# Patient Record
Sex: MALE | Race: BLACK OR AFRICAN AMERICAN | Employment: UNEMPLOYED | URBAN - METROPOLITAN AREA
[De-identification: names, ages, dates, MRNs, and addresses within clinical notes are randomized per-mention and may not be internally consistent; named-entity substitution may affect disease eponyms.]

---

## 2019-12-11 ENCOUNTER — OFFICE VISIT (OUTPATIENT)
Dept: ENT CLINIC | Age: 39
End: 2019-12-11
Payer: MEDICAID

## 2019-12-11 VITALS — HEART RATE: 65 BPM | WEIGHT: 205 LBS | DIASTOLIC BLOOD PRESSURE: 80 MMHG | SYSTOLIC BLOOD PRESSURE: 123 MMHG

## 2019-12-11 DIAGNOSIS — H91.91 HEARING LOSS OF RIGHT EAR, UNSPECIFIED HEARING LOSS TYPE: Primary | ICD-10-CM

## 2019-12-11 PROCEDURE — 99203 OFFICE O/P NEW LOW 30 MIN: CPT | Performed by: OTOLARYNGOLOGY

## 2019-12-11 RX ORDER — GABAPENTIN 100 MG/1
100 CAPSULE ORAL 3 TIMES DAILY
COMMUNITY
End: 2021-11-09

## 2019-12-11 RX ORDER — THIAMINE MONONITRATE (VIT B1) 100 MG
100 TABLET ORAL DAILY
COMMUNITY
End: 2021-11-09

## 2020-01-07 ENCOUNTER — PROCEDURE VISIT (OUTPATIENT)
Dept: AUDIOLOGY | Age: 40
End: 2020-01-07
Payer: MEDICAID

## 2020-01-07 PROCEDURE — 92557 COMPREHENSIVE HEARING TEST: CPT | Performed by: AUDIOLOGIST

## 2020-01-07 PROCEDURE — 92550 TYMPANOMETRY & REFLEX THRESH: CPT | Performed by: AUDIOLOGIST

## 2020-01-07 NOTE — PROGRESS NOTES
Subjective:      Patient ID: Balinda Bloch is a 37 y.o. male. Brandy Hameed reports a history of hearing loss since he was an infant. He wore a hearing aid in grade school but does not currently. He currently complains of communication problems. Objective:       Assessment:      Moderate sensorineural hearing loss for the left ear with a severe sensorineural hearing loss for the right ear.  Normal tympanograms were noted      Plan:      Medical follow-up   Hearing retest as needed  Hearing aid evaluation if not medically contraindicated    See scan audiogram and tympanogram for additional details          Valentin Ritchie, AuD

## 2020-04-04 ENCOUNTER — TELEPHONE (OUTPATIENT)
Dept: ENT CLINIC | Age: 40
End: 2020-04-04

## 2021-11-09 ENCOUNTER — OFFICE VISIT (OUTPATIENT)
Dept: INTERNAL MEDICINE CLINIC | Age: 41
End: 2021-11-09
Payer: MEDICAID

## 2021-11-09 VITALS
HEIGHT: 71 IN | HEART RATE: 75 BPM | OXYGEN SATURATION: 96 % | DIASTOLIC BLOOD PRESSURE: 80 MMHG | SYSTOLIC BLOOD PRESSURE: 112 MMHG | WEIGHT: 191.8 LBS | BODY MASS INDEX: 26.85 KG/M2

## 2021-11-09 DIAGNOSIS — Z13.220 SCREENING FOR CHOLESTEROL LEVEL: ICD-10-CM

## 2021-11-09 DIAGNOSIS — Z00.00 PREVENTATIVE HEALTH CARE: ICD-10-CM

## 2021-11-09 DIAGNOSIS — Z00.00 PREVENTATIVE HEALTH CARE: Primary | ICD-10-CM

## 2021-11-09 DIAGNOSIS — F10.10 ALCOHOL ABUSE: ICD-10-CM

## 2021-11-09 DIAGNOSIS — F19.91 HISTORY OF DRUG USE: ICD-10-CM

## 2021-11-09 DIAGNOSIS — Z11.3 SCREEN FOR STD (SEXUALLY TRANSMITTED DISEASE): ICD-10-CM

## 2021-11-09 LAB
A/G RATIO: 2 (ref 1.1–2.2)
ALBUMIN SERPL-MCNC: 4.9 G/DL (ref 3.4–5)
ALP BLD-CCNC: 86 U/L (ref 40–129)
ALT SERPL-CCNC: 98 U/L (ref 10–40)
ANION GAP SERPL CALCULATED.3IONS-SCNC: 13 MMOL/L (ref 3–16)
AST SERPL-CCNC: 293 U/L (ref 15–37)
BASOPHILS ABSOLUTE: 0 K/UL (ref 0–0.2)
BASOPHILS RELATIVE PERCENT: 1 %
BILIRUB SERPL-MCNC: 0.3 MG/DL (ref 0–1)
BILIRUBIN URINE: NEGATIVE
BLOOD, URINE: NEGATIVE
BUN BLDV-MCNC: 16 MG/DL (ref 7–20)
CALCIUM SERPL-MCNC: 9.9 MG/DL (ref 8.3–10.6)
CHLORIDE BLD-SCNC: 102 MMOL/L (ref 99–110)
CHOLESTEROL, TOTAL: 210 MG/DL (ref 0–199)
CLARITY: CLEAR
CO2: 25 MMOL/L (ref 21–32)
COLOR: YELLOW
CREAT SERPL-MCNC: 1.1 MG/DL (ref 0.9–1.3)
EOSINOPHILS ABSOLUTE: 0.1 K/UL (ref 0–0.6)
EOSINOPHILS RELATIVE PERCENT: 3 %
EPITHELIAL CELLS, UA: 0 /HPF (ref 0–5)
GFR AFRICAN AMERICAN: >60
GFR NON-AFRICAN AMERICAN: >60
GLUCOSE BLD-MCNC: 75 MG/DL (ref 70–99)
GLUCOSE URINE: NEGATIVE MG/DL
HCT VFR BLD CALC: 42.8 % (ref 40.5–52.5)
HDLC SERPL-MCNC: 55 MG/DL (ref 40–60)
HEMOGLOBIN: 14 G/DL (ref 13.5–17.5)
HEPATITIS C ANTIBODY INTERPRETATION: NORMAL
HYALINE CASTS: 0 /LPF (ref 0–8)
KETONES, URINE: NEGATIVE MG/DL
LDL CHOLESTEROL CALCULATED: 126 MG/DL
LEUKOCYTE ESTERASE, URINE: NEGATIVE
LYMPHOCYTES ABSOLUTE: 1.7 K/UL (ref 1–5.1)
LYMPHOCYTES RELATIVE PERCENT: 40 %
MCH RBC QN AUTO: 29.6 PG (ref 26–34)
MCHC RBC AUTO-ENTMCNC: 32.8 G/DL (ref 31–36)
MCV RBC AUTO: 90.4 FL (ref 80–100)
MICROSCOPIC EXAMINATION: NORMAL
MONOCYTES ABSOLUTE: 0.3 K/UL (ref 0–1.3)
MONOCYTES RELATIVE PERCENT: 7 %
NEUTROPHILS ABSOLUTE: 2.1 K/UL (ref 1.7–7.7)
NEUTROPHILS RELATIVE PERCENT: 49 %
NITRITE, URINE: NEGATIVE
PDW BLD-RTO: 13.5 % (ref 12.4–15.4)
PH UA: 6.5 (ref 5–8)
PLATELET # BLD: 262 K/UL (ref 135–450)
PLATELET SLIDE REVIEW: ADEQUATE
PMV BLD AUTO: 8.5 FL (ref 5–10.5)
POTASSIUM SERPL-SCNC: 5.3 MMOL/L (ref 3.5–5.1)
PROTEIN UA: NEGATIVE MG/DL
RBC # BLD: 4.74 M/UL (ref 4.2–5.9)
RBC # BLD: NORMAL 10*6/UL
RBC UA: 2 /HPF (ref 0–4)
SLIDE REVIEW: NORMAL
SODIUM BLD-SCNC: 140 MMOL/L (ref 136–145)
SPECIFIC GRAVITY UA: 1.02 (ref 1–1.03)
TOTAL PROTEIN: 7.4 G/DL (ref 6.4–8.2)
TRIGL SERPL-MCNC: 144 MG/DL (ref 0–150)
URINE TYPE: NORMAL
UROBILINOGEN, URINE: 1 E.U./DL
VLDLC SERPL CALC-MCNC: 29 MG/DL
WBC # BLD: 4.2 K/UL (ref 4–11)
WBC UA: 0 /HPF (ref 0–5)

## 2021-11-09 PROCEDURE — G8484 FLU IMMUNIZE NO ADMIN: HCPCS | Performed by: INTERNAL MEDICINE

## 2021-11-09 PROCEDURE — 99386 PREV VISIT NEW AGE 40-64: CPT | Performed by: INTERNAL MEDICINE

## 2021-11-09 SDOH — ECONOMIC STABILITY: FOOD INSECURITY: WITHIN THE PAST 12 MONTHS, YOU WORRIED THAT YOUR FOOD WOULD RUN OUT BEFORE YOU GOT MONEY TO BUY MORE.: OFTEN TRUE

## 2021-11-09 SDOH — ECONOMIC STABILITY: FOOD INSECURITY: WITHIN THE PAST 12 MONTHS, THE FOOD YOU BOUGHT JUST DIDN'T LAST AND YOU DIDN'T HAVE MONEY TO GET MORE.: OFTEN TRUE

## 2021-11-09 ASSESSMENT — ENCOUNTER SYMPTOMS
WHEEZING: 0
ABDOMINAL PAIN: 0
CONSTIPATION: 0
COLOR CHANGE: 0
BLOOD IN STOOL: 0
CHEST TIGHTNESS: 0
SHORTNESS OF BREATH: 0
COUGH: 0
SINUS PAIN: 0

## 2021-11-09 ASSESSMENT — PATIENT HEALTH QUESTIONNAIRE - PHQ9
SUM OF ALL RESPONSES TO PHQ QUESTIONS 1-9: 2
SUM OF ALL RESPONSES TO PHQ9 QUESTIONS 1 & 2: 2
SUM OF ALL RESPONSES TO PHQ QUESTIONS 1-9: 2
1. LITTLE INTEREST OR PLEASURE IN DOING THINGS: 1
2. FEELING DOWN, DEPRESSED OR HOPELESS: 1
SUM OF ALL RESPONSES TO PHQ QUESTIONS 1-9: 2

## 2021-11-09 ASSESSMENT — SOCIAL DETERMINANTS OF HEALTH (SDOH): HOW HARD IS IT FOR YOU TO PAY FOR THE VERY BASICS LIKE FOOD, HOUSING, MEDICAL CARE, AND HEATING?: SOMEWHAT HARD

## 2021-11-09 NOTE — PROGRESS NOTES
Antione Machuca (:  1980) is a 35 y.o. male,New patient, here for evaluation of the following chief complaint(s):  Established New Doctor (screened fgot std)         ASSESSMENT/PLAN:  1. Preventative health care  -     CBC Auto Differential; Future  -     Comprehensive Metabolic Panel; Future  2. Screening for cholesterol level  -     Lipid Panel; Future  3. Screen for STD (sexually transmitted disease)  -     C.trachomatis N.gonorrhoeae DNA, Urine; Future  -     Culture, Urine; Future  -     Hepatitis C Antibody; Future  -     HIV Screen; Future  -     Syphilis Antibody Cascading Reflex; Future  -     Urinalysis with Microscopic; Future  4. Alcohol abuse  5. History of drug use  At this point the patient biggest risk is his drug use and alcohol abuse history he is undergoing rehab and hopefully will be successful with that but will screen for other underlying medical conditions and states STDs if everything is normal we will just monitor him clinically and follow-up in 6 months to make sure he is doing well with his rehab    Return in about 6 months (around 2022). Subjective   SUBJECTIVE/OBJECTIVE:    Lab Review   No results found for: NA, K, CO2, BUN, CREATININE, GLUCOSE, CALCIUM  No results found for: WBC, HGB, HCT, MCV, PLT  No results found for: CHOL, TRIG, HDL, LDLDIRECT    Vitals 2021    SYSTOLIC 583 [id number]   DIASTOLIC 80 80   Pulse 75 65   SpO2 96 -   Weight 191 lb 12.8 oz 205 lb   Height 5' 11\" -   Body mass index 26.75 kg/m2 -       Doing fairly well he is interested in having testing for STDs    Patient is undergoing drug and alcohol rehab and is doing fairly well with that and reports that he has not used any drugs recently      Review of Systems   Constitutional: Negative for activity change, appetite change, fatigue and unexpected weight change. HENT: Negative for congestion, ear pain and sinus pain.     Respiratory: Negative for cough, chest tightness, shortness of breath and wheezing. Cardiovascular: Negative for chest pain and palpitations. Gastrointestinal: Negative for abdominal pain, blood in stool and constipation. Endocrine: Negative for cold intolerance, heat intolerance and polyuria. Genitourinary: Negative for dysuria, frequency and urgency. Musculoskeletal: Negative for arthralgias and myalgias. Skin: Negative for color change and rash. Neurological: Negative for weakness and headaches. Hematological: Negative for adenopathy. Does not bruise/bleed easily. Psychiatric/Behavioral: Negative for agitation, dysphoric mood and sleep disturbance. Objective   Physical Exam  Vitals and nursing note reviewed. Constitutional:       General: He is not in acute distress. Appearance: Normal appearance. HENT:      Head: Normocephalic and atraumatic. Right Ear: Tympanic membrane normal.      Left Ear: Tympanic membrane normal.      Nose: Nose normal.   Eyes:      Extraocular Movements: Extraocular movements intact. Conjunctiva/sclera: Conjunctivae normal.      Pupils: Pupils are equal, round, and reactive to light. Neck:      Vascular: No carotid bruit. Cardiovascular:      Rate and Rhythm: Normal rate and regular rhythm. Pulses: Normal pulses. Heart sounds: No murmur heard. Pulmonary:      Effort: Pulmonary effort is normal. No respiratory distress. Breath sounds: Normal breath sounds. Abdominal:      General: Abdomen is flat. Bowel sounds are normal. There is no distension. Palpations: Abdomen is soft. Tenderness: There is no abdominal tenderness. Musculoskeletal:         General: No swelling, tenderness or deformity. Cervical back: Normal range of motion and neck supple. No rigidity or tenderness. Right lower leg: No edema. Left lower leg: No edema. Lymphadenopathy:      Cervical: No cervical adenopathy. Skin:     Coloration: Skin is not jaundiced.       Findings: No bruising, erythema or lesion. Neurological:      General: No focal deficit present. Mental Status: He is alert and oriented to person, place, and time. Cranial Nerves: No cranial nerve deficit. Motor: No weakness. Gait: Gait normal.            This dictation was generated by voice recognition computer software. Although all attempts are made to edit the dictation for accuracy, there may be errors in the transcription that are not intended. An electronic signature was used to authenticate this note.     --Rufino Bernal MD

## 2021-11-10 DIAGNOSIS — K75.9 HEPATITIS: Primary | ICD-10-CM

## 2021-11-10 LAB
C. TRACHOMATIS DNA ,URINE: NEGATIVE
HIV AG/AB: NORMAL
HIV ANTIGEN: NORMAL
HIV-1 ANTIBODY: NORMAL
HIV-2 AB: NORMAL
N. GONORRHOEAE DNA, URINE: NEGATIVE
RPR CONFIRMATORY: NORMAL
TOTAL SYPHILLIS IGG/IGM: REACTIVE
URINE CULTURE, ROUTINE: NORMAL

## 2021-11-14 LAB — TREPONEMA PALLIDUM ANTIBODIES: REACTIVE

## 2021-11-30 ENCOUNTER — HOSPITAL ENCOUNTER (OUTPATIENT)
Dept: ULTRASOUND IMAGING | Age: 41
Discharge: HOME | End: 2021-11-30
Payer: MEDICAID

## 2021-11-30 DIAGNOSIS — K75.9 HEPATITIS: ICD-10-CM

## 2021-11-30 PROCEDURE — 76705 ECHO EXAM OF ABDOMEN: CPT

## 2022-01-05 ENCOUNTER — OFFICE VISIT (OUTPATIENT)
Dept: ENT CLINIC | Age: 42
End: 2022-01-05
Payer: MEDICAID

## 2022-01-05 VITALS — SYSTOLIC BLOOD PRESSURE: 119 MMHG | DIASTOLIC BLOOD PRESSURE: 81 MMHG | TEMPERATURE: 97.7 F

## 2022-01-05 DIAGNOSIS — H90.3 BILATERAL SENSORINEURAL HEARING LOSS: ICD-10-CM

## 2022-01-05 DIAGNOSIS — H61.23 IMPACTED CERUMEN OF BOTH EARS: ICD-10-CM

## 2022-01-05 PROCEDURE — G8419 CALC BMI OUT NRM PARAM NOF/U: HCPCS | Performed by: OTOLARYNGOLOGY

## 2022-01-05 PROCEDURE — G8427 DOCREV CUR MEDS BY ELIG CLIN: HCPCS | Performed by: OTOLARYNGOLOGY

## 2022-01-05 PROCEDURE — 99213 OFFICE O/P EST LOW 20 MIN: CPT | Performed by: OTOLARYNGOLOGY

## 2022-01-05 PROCEDURE — G8484 FLU IMMUNIZE NO ADMIN: HCPCS | Performed by: OTOLARYNGOLOGY

## 2022-01-05 PROCEDURE — 4004F PT TOBACCO SCREEN RCVD TLK: CPT | Performed by: OTOLARYNGOLOGY

## 2022-01-05 ASSESSMENT — ENCOUNTER SYMPTOMS
SINUS PAIN: 0
RHINORRHEA: 0
SORE THROAT: 0

## 2022-01-05 NOTE — PROGRESS NOTES
[facility]         PCP:  Maxine Sampson MD      CHIEF COMPLAINT  Chief Complaint   Patient presents with    Hearing Loss     Chronic, both ears    Cerumen Impaction     Both ears       HISTORY OF PRESENT ILLNESS       Scout Llanes is a 35 y.o. male here for evaluation and treatment of hearing loss in both ears, for several years. He saw his audiologist recently and was found to have excessive impacted cerumen in both ears. He was referred for evaluation of the chronic hearing loss as well as for removal of the cerumen impaction. He denied any other current ear nose throat or sinus problems. Patient stated that his hearing loss is congenital \"I was born with it. \"  He stated that he has used hearing aids since childhood. REVIEW OF SYSTEMS   Review of Systems   Constitutional: Negative for chills and fever. HENT: Positive for hearing loss and tinnitus (ringing off and on). Negative for congestion, ear discharge, ear pain, rhinorrhea, sinus pain and sore throat. PAST MEDICAL HISTORY    Past Medical History:   Diagnosis Date    Alcohol abuse 11/9/2021    History of drug use 11/9/2021       No past surgical history on file. EXAMINATION    Vitals:    01/05/22 1418   BP: 119/81   Site: Right Upper Arm   Position: Sitting   Cuff Size: Large Adult   Temp: 97.7 °F (36.5 °C)   TempSrc: Temporal       Physical Exam  Vitals reviewed. Constitutional:       General: He is awake. He is not in acute distress. Appearance: Normal appearance. He is well-developed. He is not ill-appearing or toxic-appearing. HENT:      Head: Normocephalic and atraumatic. Salivary Glands: Right salivary gland is not diffusely enlarged or tender. Left salivary gland is not diffusely enlarged or tender.       Right Ear: Tympanic membrane, ear canal and external ear normal.      Left Ear: Tympanic membrane, ear canal and external ear normal.      Ears:      Comments: Bilateral otomicroscopy performed. TMs and EACs normal     Nose: Nose normal. No nasal deformity, septal deviation, mucosal edema, congestion or rhinorrhea. Right Turbinates: Not enlarged. Left Turbinates: Not enlarged. Right Sinus: No maxillary sinus tenderness or frontal sinus tenderness. Left Sinus: No maxillary sinus tenderness or frontal sinus tenderness. Mouth/Throat:      Lips: Pink. No lesions. Mouth: Mucous membranes are moist. No oral lesions. Tongue: No lesions. Palate: No mass and lesions. Pharynx: Oropharynx is clear. Uvula midline. No oropharyngeal exudate or posterior oropharyngeal erythema. Tonsils: No tonsillar exudate or tonsillar abscesses. Neck:      Thyroid: No thyroid mass, thyromegaly or thyroid tenderness. Trachea: No tracheal deviation. Comments: No cervical masses or tenderness. Musculoskeletal:      Cervical back: Normal range of motion and neck supple. Lymphadenopathy:      Cervical: No cervical adenopathy. Neurological:      Mental Status: He is alert. PROCEDURE - REMOVAL OF CERUMEN IMPACTION (13986):  Obstructing cerumen impaction partially occluding both of the EACs  and obscuring complete isualization of the both tympanic membranes, was removed under otomicroscopic visualization, with instrumentation, using a Billeau wire loop  Under otomicroscopic examination, the bilateral EACs and TMs appeared to be normal.                                           IMPRESSION / DIAGNOSES / Kimmy Valadez was seen today for hearing loss and cerumen impaction. Diagnoses and all orders for this visit:    Bilateral sensorineural hearing loss  Comments:  Congenital, and at least since childhood, according to patient. Impacted cerumen of both ears         RECOMMENDATIONS/PLAN      1. Okay for bilateral hearing aids.     2. Return for symptoms of excessive ear wax, or any other ear, nose, throat, or sinus problems. Patient Instructions   · You may proceed with amplification therapy, hearing aid. You should follow up with Dr. Yasmin Ocasio for hearing aids. · You should return for an annual hearing test to monitor your hearing, and whenever your hearing further decreases significantly. · You should employ the tinnitus masking techniques and strategies we discussed, as needed. Bedside tinnitus masking devices (eg. a white noise machine, noise machine, noise chaya on your cell phone, fan on in the room, radio with light music or tuned between stations to white noise static) can be very helpful. · You should avoid exposure to excessively high levels of noise/sound and use hearing protection measures we discussed, as needed, if such exposure is unavoidable. · NO Q-TIPS IN THE EARS  You should never clean your ears with a Q-tip, cotton tipped applicator, Ketty pin, paper clip, or any other instrument. I recommend only use of one the several ear wax removal kits available \"over the counter\" (eg. B[employer]ch and Lomb or Murine, or [employer]) if you feel a need to try to remove ear wax. No other methods should be self used for this purpose as there is danger of injury to the ear and risk of irreparable and irreversible permanent hearing loss. TIME:  I spent a total of 20 minutes with this patient for pre-visit review of the medical record, history, physical examination, counseling, reviewing the medical record, and documenting the visit.

## 2022-01-05 NOTE — PATIENT INSTRUCTIONS
· You may proceed with amplification therapy, hearing aid. You should follow up with Dr. Kaz Augustin for hearing aids. · You should return for an annual hearing test to monitor your hearing, and whenever your hearing further decreases significantly. · You should employ the tinnitus masking techniques and strategies we discussed, as needed. Bedside tinnitus masking devices (eg. a white noise machine, noise machine, noise chaya on your cell phone, fan on in the room, radio with light music or tuned between stations to white noise static) can be very helpful. · You should avoid exposure to excessively high levels of noise/sound and use hearing protection measures we discussed, as needed, if such exposure is unavoidable. · NO Q-TIPS IN THE EARS  You should never clean your ears with a Q-tip, cotton tipped applicator, Ketty pin, paper clip, or any other instrument. I recommend only use of one the several ear wax removal kits available \"over the counter\" (eg. B[employer]ch and Lomb or Murine, or [employer]) if you feel a need to try to remove ear wax. No other methods should be self used for this purpose as there is danger of injury to the ear and risk of irreparable and irreversible permanent hearing loss.

## 2022-05-23 ENCOUNTER — OFFICE VISIT (OUTPATIENT)
Dept: INTERNAL MEDICINE CLINIC | Age: 42
End: 2022-05-23
Payer: MEDICAID

## 2022-05-23 VITALS
HEIGHT: 71 IN | OXYGEN SATURATION: 99 % | SYSTOLIC BLOOD PRESSURE: 112 MMHG | HEART RATE: 66 BPM | BODY MASS INDEX: 31.11 KG/M2 | DIASTOLIC BLOOD PRESSURE: 78 MMHG | WEIGHT: 222.2 LBS

## 2022-05-23 DIAGNOSIS — M54.12 CERVICAL RADICULOPATHY: Primary | ICD-10-CM

## 2022-05-23 PROCEDURE — 99213 OFFICE O/P EST LOW 20 MIN: CPT | Performed by: NURSE PRACTITIONER

## 2022-05-23 PROCEDURE — G8427 DOCREV CUR MEDS BY ELIG CLIN: HCPCS | Performed by: NURSE PRACTITIONER

## 2022-05-23 PROCEDURE — G8417 CALC BMI ABV UP PARAM F/U: HCPCS | Performed by: NURSE PRACTITIONER

## 2022-05-23 PROCEDURE — 4004F PT TOBACCO SCREEN RCVD TLK: CPT | Performed by: NURSE PRACTITIONER

## 2022-05-23 RX ORDER — QUETIAPINE FUMARATE 50 MG/1
50 TABLET, FILM COATED ORAL 2 TIMES DAILY
COMMUNITY
Start: 2022-05-14 | End: 2022-07-08

## 2022-05-23 RX ORDER — LANOLIN ALCOHOL/MO/W.PET/CERES
100 CREAM (GRAM) TOPICAL DAILY
COMMUNITY
Start: 2022-03-25

## 2022-05-23 RX ORDER — IBUPROFEN 800 MG/1
800 TABLET ORAL EVERY 8 HOURS PRN
COMMUNITY
Start: 2022-03-25

## 2022-05-23 RX ORDER — CHOLECALCIFEROL (VITAMIN D3) 125 MCG
5 CAPSULE ORAL NIGHTLY
COMMUNITY
Start: 2022-05-14

## 2022-05-23 RX ORDER — QUETIAPINE FUMARATE 100 MG/1
100 TABLET, FILM COATED ORAL 2 TIMES DAILY
COMMUNITY
Start: 2022-03-25 | End: 2022-07-08

## 2022-05-23 RX ORDER — OMEPRAZOLE 20 MG/1
20 CAPSULE, DELAYED RELEASE ORAL DAILY
COMMUNITY
Start: 2022-03-25

## 2022-05-23 RX ORDER — HYDROXYZINE PAMOATE 50 MG/1
50 CAPSULE ORAL 3 TIMES DAILY PRN
COMMUNITY
Start: 2022-03-25

## 2022-05-23 RX ORDER — MIRTAZAPINE 15 MG/1
7.5 TABLET, FILM COATED ORAL NIGHTLY
COMMUNITY
Start: 2022-05-14

## 2022-05-23 RX ORDER — FOLIC ACID 1 MG/1
1 TABLET ORAL DAILY
COMMUNITY
Start: 2022-03-25

## 2022-05-23 RX ORDER — LORATADINE 10 MG/1
TABLET ORAL
COMMUNITY
Start: 2022-03-25

## 2022-05-23 RX ORDER — FLUOXETINE HYDROCHLORIDE 20 MG/1
20 CAPSULE ORAL DAILY
COMMUNITY
Start: 2022-03-25

## 2022-05-23 RX ORDER — FLUOXETINE 10 MG/1
10 CAPSULE ORAL DAILY
COMMUNITY
End: 2022-07-08 | Stop reason: ALTCHOICE

## 2022-05-23 NOTE — PROGRESS NOTES
SUBJECTIVE:    Patient ID: Vanessa Castro is a 35 y.o. male. CC: Left arm numbness    HPI: The patient presents to the office for an acute visit. Left arm tingling, numbness for a few weeks. No known injuries or traumas. He has sensation of 'kink' in his neck  Affecting arm but not hand. No arm weakness. Full range of motion. No redness, swelling, rash. No treatments at home. Right leg pain just started today. No injury or trauma. Current Outpatient Medications   Medication Sig Dispense Refill    melatonin 5 MG TABS tablet Take 5 mg by mouth nightly       mirtazapine (REMERON) 15 MG tablet Take 15 mg by mouth nightly       omeprazole (PRILOSEC) 20 MG delayed release capsule Take 20 mg by mouth Daily       thiamine 100 MG tablet Take 100 mg by mouth daily       ibuprofen (ADVIL;MOTRIN) 800 MG tablet Take 800 mg by mouth every 8 hours as needed       hydrOXYzine (VISTARIL) 50 MG capsule Take 50 mg by mouth 3 times daily as needed       folic acid (FOLVITE) 1 MG tablet Take 1 mg by mouth daily       VITAMIN D PO Take by mouth daily       QUEtiapine (SEROQUEL) 50 MG tablet Take 50 mg by mouth 2 times daily       QUEtiapine (SEROQUEL) 100 MG tablet Take 100 mg by mouth 2 times daily       loratadine (CLARITIN) 10 MG tablet  (Patient not taking: Reported on 5/23/2022)      FLUoxetine (PROZAC) 10 MG capsule Take 10 mg by mouth daily       FLUoxetine (PROZAC) 20 MG capsule Take 20 mg by mouth daily        No current facility-administered medications for this visit. Review of Systems   Musculoskeletal: Positive for neck pain. Skin: Negative for rash. Neurological: Positive for numbness. Negative for weakness. All other systems reviewed and are negative. OBJECTIVE:  Physical Exam  Constitutional:       Appearance: Normal appearance. HENT:      Head: Normocephalic and atraumatic. Cardiovascular:      Pulses:           Radial pulses are 2+ on the left side. Neurological:      General: No focal deficit present. Mental Status: He is alert and oriented to person, place, and time. Cranial Nerves: Cranial nerves are intact. Sensory: Sensation is intact. No sensory deficit. Motor: No weakness, tremor or atrophy. Psychiatric:         Mood and Affect: Mood normal.         Behavior: Behavior normal.        /78   Pulse 66   Ht 5' 11\" (1.803 m)   Wt 222 lb 3.2 oz (100.8 kg)   SpO2 99%   BMI 30.99 kg/m²      PHQ Scores 11/9/2021   PHQ2 Score 2   PHQ9 Score 2     Interpretation of Total Score Depression Severity: 1-4 = Minimal depression, 5-9 = Mild depression, 10-14 = Moderate depression, 15-19 = Moderately severe depression, 20-27 =Severe depression        ASSESSMENT/PLAN:  Baldo Banda was seen today for numbness and leg pain. Diagnoses and all orders for this visit:    Cervical radiculopathy  -Left arm but not hand numbness and tingling  -This is intermittent and mild  -No known injury or trauma. Thinks he may have slept wrong and has a \"kink\" in his neck  -Ibuprofen helps which he has been taking once daily  -Normal pulses. Normal sensation. Normal strength on examination  -Recommend increase ibuprofen to 2-3 times daily. Try this for about 7-10 days. If symptoms are not improving or worsening, please follow-up.       MARKUS Coombs - CNP

## 2022-07-08 ENCOUNTER — TELEMEDICINE (OUTPATIENT)
Dept: INTERNAL MEDICINE CLINIC | Age: 42
End: 2022-07-08
Payer: MEDICAID

## 2022-07-08 DIAGNOSIS — U07.1 COVID: Primary | ICD-10-CM

## 2022-07-08 PROCEDURE — 99213 OFFICE O/P EST LOW 20 MIN: CPT | Performed by: INTERNAL MEDICINE

## 2022-07-08 PROCEDURE — G8417 CALC BMI ABV UP PARAM F/U: HCPCS | Performed by: INTERNAL MEDICINE

## 2022-07-08 PROCEDURE — 4004F PT TOBACCO SCREEN RCVD TLK: CPT | Performed by: INTERNAL MEDICINE

## 2022-07-08 PROCEDURE — G8427 DOCREV CUR MEDS BY ELIG CLIN: HCPCS | Performed by: INTERNAL MEDICINE

## 2022-07-08 RX ORDER — CARIPRAZINE 1.5 MG/1
CAPSULE, GELATIN COATED ORAL
COMMUNITY
Start: 2022-07-05

## 2022-07-08 RX ORDER — PRAZOSIN HYDROCHLORIDE 1 MG/1
CAPSULE ORAL
COMMUNITY
Start: 2022-07-05

## 2022-07-08 ASSESSMENT — ENCOUNTER SYMPTOMS
SHORTNESS OF BREATH: 0
SORE THROAT: 1
COUGH: 1

## 2022-07-08 NOTE — PROGRESS NOTES
Austen Gamez (:  1980) is a Established patient, here for evaluation of the following:    Assessment & Plan   Below is the assessment and plan developed based on review of pertinent history, physical exam, labs, studies, and medications. 1. COVID  -     nirmatrelvir/ritonavir (PAXLOVID) 20 x 150 MG & 10 x 100MG; Take 3 tablets (two 150 mg nirmatrelvir and one 100 mg ritonavir tablets) by mouth every 12 hours for 5 days. , Disp-30 tablet, R-0Normal  The patient was tested positive for COVID today he is an only having upper respite tract symptoms no chest pain or shortness of breath discussed with him the warning signs if he should develop them. Patient should contact me to go to the emergency room for now we will cannot use the antiviral treatment for him given that he is at higher risk living in a rehab condition at this stage  Return if symptoms worsen or fail to improve, for As scheduled. Subjective   Cough  This is a new problem. The current episode started in the past 7 days. The cough is non-productive. Associated symptoms include a sore throat. Pertinent negatives include no chest pain, chills, fever, headaches, shortness of breath, sweats or weight loss. Review of Systems   Constitutional: Negative for chills, fever and weight loss. HENT: Positive for sore throat. Respiratory: Positive for cough. Negative for shortness of breath. Cardiovascular: Negative for chest pain. Neurological: Negative for headaches.           Objective   Patient-Reported Vitals  No data recorded     Physical Exam  [INSTRUCTIONS:  \"[x]\" Indicates a positive item  \"[]\" Indicates a negative item  -- DELETE ALL ITEMS NOT EXAMINED]    Constitutional: [x] Appears well-developed and well-nourished [x] No apparent distress      [] Abnormal -     Mental status: [x] Alert and awake  [x] Oriented to person/place/time [x] Able to follow commands    [] Abnormal -     Eyes:   EOM    [x]  Normal    [] Abnormal -   Sclera [x]  Normal    [] Abnormal -          Discharge [x]  None visible   [] Abnormal -     HENT: [x] Normocephalic, atraumatic  [] Abnormal -   [x] Mouth/Throat: Mucous membranes are moist    External Ears [x] Normal  [] Abnormal -    Neck: [x] No visualized mass [] Abnormal -     Pulmonary/Chest: [x] Respiratory effort normal   [x] No visualized signs of difficulty breathing or respiratory distress        [] Abnormal -      Musculoskeletal:   [x] Normal gait with no signs of ataxia         [x] Normal range of motion of neck        [] Abnormal -     Neurological:        [x] No Facial Asymmetry (Cranial nerve 7 motor function) (limited exam due to video visit)          [x] No gaze palsy        [] Abnormal -          Skin:        [x] No significant exanthematous lesions or discoloration noted on facial skin         [] Abnormal -            Psychiatric:       [x] Normal Affect [] Abnormal -        [x] No Hallucinations    Other pertinent observable physical exam findings:-             On this date 7/8/2022 I have spent 25 minutes reviewing previous notes, test results and face to face (virtual) with the patient discussing the diagnosis and importance of compliance with the treatment plan as well as documenting on the day of the visitChristie Gandhi, was evaluated through a synchronous (real-time) audio-video encounter. The patient (or guardian if applicable) is aware that this is a billable service, which includes applicable co-pays. This Virtual Visit was conducted with patient's (and/or legal guardian's) consent. The visit was conducted pursuant to the emergency declaration under the [facility], [address] [place] [address] authority and the [employer] and [employer] General Act. Patient identification was verified, and a caregiver was present when appropriate. The patient [facility] [address] [facility],  [address] [facility].    Provider was located at [facility] [address] [facility],  [address].        --Manav Little MD

## 2022-11-22 ENCOUNTER — OFFICE VISIT (OUTPATIENT)
Dept: INTERNAL MEDICINE CLINIC | Age: 42
End: 2022-11-22
Payer: MEDICAID

## 2022-11-22 VITALS
BODY MASS INDEX: 28.7 KG/M2 | OXYGEN SATURATION: 97 % | HEIGHT: 71 IN | DIASTOLIC BLOOD PRESSURE: 82 MMHG | HEART RATE: 96 BPM | WEIGHT: 205 LBS | SYSTOLIC BLOOD PRESSURE: 120 MMHG

## 2022-11-22 DIAGNOSIS — F15.10 METHAMPHETAMINE ABUSE (HCC): ICD-10-CM

## 2022-11-22 DIAGNOSIS — R30.0 DYSURIA: Primary | ICD-10-CM

## 2022-11-22 LAB
BILIRUBIN, POC: NEGATIVE
BLOOD URINE, POC: NEGATIVE
CLARITY, POC: NORMAL
COLOR, POC: NORMAL
GLUCOSE URINE, POC: NEGATIVE
KETONES, POC: NEGATIVE
LEUKOCYTE EST, POC: NEGATIVE
NITRITE, POC: NEGATIVE
PH, POC: 6
PROTEIN, POC: NEGATIVE
SPECIFIC GRAVITY, POC: >=1.03
UROBILINOGEN, POC: 0.2

## 2022-11-22 PROCEDURE — G8427 DOCREV CUR MEDS BY ELIG CLIN: HCPCS | Performed by: NURSE PRACTITIONER

## 2022-11-22 PROCEDURE — G8417 CALC BMI ABV UP PARAM F/U: HCPCS | Performed by: NURSE PRACTITIONER

## 2022-11-22 PROCEDURE — 99213 OFFICE O/P EST LOW 20 MIN: CPT | Performed by: NURSE PRACTITIONER

## 2022-11-22 PROCEDURE — 81002 URINALYSIS NONAUTO W/O SCOPE: CPT | Performed by: NURSE PRACTITIONER

## 2022-11-22 PROCEDURE — G8484 FLU IMMUNIZE NO ADMIN: HCPCS | Performed by: NURSE PRACTITIONER

## 2022-11-22 PROCEDURE — 4004F PT TOBACCO SCREEN RCVD TLK: CPT | Performed by: NURSE PRACTITIONER

## 2022-11-22 RX ORDER — GUAIFENESIN 600 MG/1
TABLET, EXTENDED RELEASE ORAL
COMMUNITY
Start: 2022-10-26

## 2022-11-22 RX ORDER — IBUPROFEN 600 MG/1
600 TABLET ORAL
COMMUNITY
Start: 2022-10-07

## 2022-11-22 ASSESSMENT — PATIENT HEALTH QUESTIONNAIRE - PHQ9: DEPRESSION UNABLE TO ASSESS: URGENT/EMERGENT SITUATION

## 2022-11-22 ASSESSMENT — ENCOUNTER SYMPTOMS: ABDOMINAL PAIN: 0

## 2022-11-22 NOTE — PROGRESS NOTES
SUBJECTIVE:    Patient ID: Pipe Orellana is a 38 y.o. male. CC: painful urination    HPI: The patient presents to the office for an acute visit. He is accompanied by his drug rehab , Nani Rubinstein. He has pain when urinating. No hematuria  Symptoms for 3-4 weeks. Pain in penis when urinating. No testicular pain. No concerns for STD. No fever. Admits he is not drinking much water. He is in drug rehab, relapse 3-4 days ago with meth. He has relapsed multiple times since starting rehab. Current Outpatient Medications   Medication Sig Dispense Refill    MUCUS RELIEF 600 MG extended release tablet       VRAYLAR 1.5 MG capsule       melatonin 5 MG TABS tablet Take 5 mg by mouth nightly 1-2 tan hs      folic acid (FOLVITE) 1 MG tablet Take 1 mg by mouth daily      ibuprofen (ADVIL;MOTRIN) 600 MG tablet 600 mg      prazosin (MINIPRESS) 1 MG capsule       mirtazapine (REMERON) 15 MG tablet Take 7.5 mg by mouth nightly       omeprazole (PRILOSEC) 20 MG delayed release capsule Take 20 mg by mouth Daily       thiamine 100 MG tablet Take 100 mg by mouth daily       loratadine (CLARITIN) 10 MG tablet  (Patient not taking: Reported on 5/23/2022)      ibuprofen (ADVIL;MOTRIN) 800 MG tablet Take 800 mg by mouth every 8 hours as needed       hydrOXYzine (VISTARIL) 50 MG capsule Take 50 mg by mouth 3 times daily as needed       FLUoxetine (PROZAC) 20 MG capsule Take 20 mg by mouth daily       VITAMIN D PO Take by mouth daily  (Patient not taking: Reported on 7/8/2022)       No current facility-administered medications for this visit. Review of Systems   Constitutional:  Negative for chills and fever. Gastrointestinal:  Negative for abdominal pain. Genitourinary:  Positive for dysuria and penile pain (when urinating). Negative for difficulty urinating, flank pain, frequency, genital sores, hematuria, penile discharge, penile swelling, scrotal swelling and urgency.    Skin:  Negative for rash and wound. OBJECTIVE:  Physical Exam  Constitutional:       General: He is awake. He is not in acute distress. Appearance: Normal appearance. Comments: Heavy eyelids, slurring words, slowed movements, consistent with illicit drug abuse   HENT:      Head: Normocephalic and atraumatic. Cardiovascular:      Rate and Rhythm: Normal rate and regular rhythm. Pulmonary:      Effort: Pulmonary effort is normal.      Breath sounds: Normal breath sounds. Genitourinary:     Penis: Normal and circumcised. No erythema, tenderness, discharge, swelling or lesions. Testes: Normal.         Right: Tenderness or swelling not present. Right testis is descended. Left: Tenderness or swelling not present. Left testis is descended. Skin:     General: Skin is warm and dry. /82   Pulse 96   Ht 5' 11\" (1.803 m)   Wt 205 lb (93 kg)   SpO2 97%   BMI 28.59 kg/m²      PHQ Scores 11/9/2021   PHQ2 Score 2   PHQ9 Score 2     Interpretation of Total Score Depression Severity: 1-4 = Minimal depression, 5-9 = Mild depression, 10-14 = Moderate depression, 15-19 = Moderately severe depression, 20-27 =Severe depression        ASSESSMENT/PLAN:  Mago Mckinnon was seen today for urinary pain. Diagnoses and all orders for this visit:    Dysuria  - Present in context of illicit substance abuse  - UA clear  - He denies STD concerns  - Exam unremarkable. - We discussed illicit drug abuse can cause dysuria. Also discussed conditions such as rhabdomyolysis which is risk with substance abuse. He admits he is not drinking much water. .  We discussed obtained additional blood test to evaluate renal function, etc.  He would prefer to hold off in favor of increasing fluids to see if this helps dysuria. Red flags discussed.   RTC prn    -     POCT Urinalysis no Micro    Methamphetamine abuse ([facility] [address]  - Chronic, recurrent problem  - He is in rehab but has relapsed multiple times per his report      MARKUS Baker - CNP